# Patient Record
Sex: MALE | NOT HISPANIC OR LATINO | URBAN - METROPOLITAN AREA
[De-identification: names, ages, dates, MRNs, and addresses within clinical notes are randomized per-mention and may not be internally consistent; named-entity substitution may affect disease eponyms.]

---

## 2023-05-26 ENCOUNTER — OFFICE VISIT (OUTPATIENT)
Dept: URBAN - METROPOLITAN AREA CLINIC 63 | Facility: CLINIC | Age: 48
End: 2023-05-26

## 2023-06-01 ENCOUNTER — WEB ENCOUNTER (OUTPATIENT)
Dept: URBAN - METROPOLITAN AREA CLINIC 63 | Facility: CLINIC | Age: 48
End: 2023-06-01

## 2023-06-01 ENCOUNTER — LAB OUTSIDE AN ENCOUNTER (OUTPATIENT)
Dept: URBAN - METROPOLITAN AREA CLINIC 63 | Facility: CLINIC | Age: 48
End: 2023-06-01

## 2023-06-01 ENCOUNTER — OFFICE VISIT (OUTPATIENT)
Dept: URBAN - METROPOLITAN AREA CLINIC 63 | Facility: CLINIC | Age: 48
End: 2023-06-01
Payer: COMMERCIAL

## 2023-06-01 VITALS
SYSTOLIC BLOOD PRESSURE: 130 MMHG | OXYGEN SATURATION: 98 % | HEIGHT: 67 IN | BODY MASS INDEX: 31.36 KG/M2 | HEART RATE: 83 BPM | WEIGHT: 199.8 LBS | DIASTOLIC BLOOD PRESSURE: 86 MMHG | TEMPERATURE: 97.5 F

## 2023-06-01 DIAGNOSIS — R79.89 ABNORMAL LFTS: ICD-10-CM

## 2023-06-01 DIAGNOSIS — R19.8 RECTAL DISCHARGE: ICD-10-CM

## 2023-06-01 DIAGNOSIS — Z12.11 COLON CANCER SCREENING: ICD-10-CM

## 2023-06-01 DIAGNOSIS — K62.5 RECTAL BLEEDING: ICD-10-CM

## 2023-06-01 PROCEDURE — 99204 OFFICE O/P NEW MOD 45 MIN: CPT | Performed by: PHYSICIAN ASSISTANT

## 2023-06-01 RX ORDER — ONDANSETRON HYDROCHLORIDE 4 MG/1
1 TABLET TABLET, FILM COATED ORAL
Qty: 2 | Refills: 0 | OUTPATIENT
Start: 2023-06-01

## 2023-06-01 RX ORDER — POLYETHYLENE GLYCOL 3350, SODIUM CHLORIDE, SODIUM BICARBONATE, POTASSIUM CHLORIDE 420; 11.2; 5.72; 1.48 G/4L; G/4L; G/4L; G/4L
AS DIRECTED POWDER, FOR SOLUTION ORAL ONCE
Qty: 4000 | Refills: 0 | OUTPATIENT
Start: 2023-06-01 | End: 2023-06-02

## 2023-06-01 NOTE — HPI-TODAY'S VISIT:
Alton is a pleasant 47-year-old male who presents today for evaluation of a screening colonoscopy, rectal bleeding and rectal discharge  Labs dated 5/25/2023 showed a normal renal function.  AST 70, ALT 81 but otherwise normal LFTs.  Normal TSH.  Normal hemoglobin.  Normal platelets.  Ever since he had COVID about a year ago, he has had persistent rectal discharge. Occasional BRBPR when wiping. Patient is colonoscopy naive. Notes 1-2 daily bowel movements without constipation or diarrhea. Denies nausea, vomiting, heartburn, reflux, dysphagia, odynophagia, hematemesis, coffee ground emesis, abdominal pain, change in bowel habits, abnormal weight loss or melena. Denies family history of colon cancer or colon polyps. No other GI complaints at this time

## 2023-06-08 ENCOUNTER — LAB OUTSIDE AN ENCOUNTER (OUTPATIENT)
Dept: URBAN - METROPOLITAN AREA CLINIC 63 | Facility: CLINIC | Age: 48
End: 2023-06-08

## 2023-06-19 ENCOUNTER — OFFICE VISIT (OUTPATIENT)
Dept: URBAN - METROPOLITAN AREA SURGERY CENTER 4 | Facility: SURGERY CENTER | Age: 48
End: 2023-06-19
Payer: COMMERCIAL

## 2023-06-19 ENCOUNTER — TELEPHONE ENCOUNTER (OUTPATIENT)
Dept: URBAN - METROPOLITAN AREA CLINIC 63 | Facility: CLINIC | Age: 48
End: 2023-06-19

## 2023-06-19 ENCOUNTER — OUT OF OFFICE VISIT (OUTPATIENT)
Dept: URBAN - METROPOLITAN AREA SURGERY CENTER 4 | Facility: SURGERY CENTER | Age: 48
End: 2023-06-19

## 2023-06-19 DIAGNOSIS — Z12.11 COLON CANCER SCREENING: ICD-10-CM

## 2023-06-19 DIAGNOSIS — K62.1 RECTAL POLYP: ICD-10-CM

## 2023-06-19 DIAGNOSIS — D12.8 BENIGN RECTAL TUMOR: ICD-10-CM

## 2023-06-19 DIAGNOSIS — D12.2 ADENOMATOUS POLYP OF ASCENDING COLON: ICD-10-CM

## 2023-06-19 DIAGNOSIS — D12.3 BENIGN NEOPLASM OF TRANSVERSE COLON: ICD-10-CM

## 2023-06-19 DIAGNOSIS — D12.2 BENIGN NEOPLASM OF ASCENDING COLON: ICD-10-CM

## 2023-06-19 PROCEDURE — 45380 COLONOSCOPY AND BIOPSY: CPT | Performed by: INTERNAL MEDICINE

## 2023-06-19 PROCEDURE — 45385 COLONOSCOPY W/LESION REMOVAL: CPT | Performed by: INTERNAL MEDICINE

## 2023-06-19 PROCEDURE — 00811 ANES LWR INTST NDSC NOS: CPT | Performed by: NURSE ANESTHETIST, CERTIFIED REGISTERED

## 2023-06-19 RX ORDER — ONDANSETRON HYDROCHLORIDE 4 MG/1
1 TABLET TABLET, FILM COATED ORAL
Qty: 2 | Refills: 0 | Status: ACTIVE | COMMUNITY
Start: 2023-06-01

## 2023-07-11 ENCOUNTER — OFFICE VISIT (OUTPATIENT)
Dept: URBAN - METROPOLITAN AREA CLINIC 63 | Facility: CLINIC | Age: 48
End: 2023-07-11
Payer: COMMERCIAL

## 2023-07-11 VITALS
BODY MASS INDEX: 31.08 KG/M2 | OXYGEN SATURATION: 97 % | SYSTOLIC BLOOD PRESSURE: 110 MMHG | DIASTOLIC BLOOD PRESSURE: 60 MMHG | HEART RATE: 60 BPM | WEIGHT: 198 LBS | TEMPERATURE: 98.7 F | HEIGHT: 67 IN

## 2023-07-11 DIAGNOSIS — L98.8 FISTULA: ICD-10-CM

## 2023-07-11 DIAGNOSIS — R79.89 ABNORMAL LFTS: ICD-10-CM

## 2023-07-11 DIAGNOSIS — Z12.11 COLON CANCER SCREENING: ICD-10-CM

## 2023-07-11 DIAGNOSIS — K62.5 RECTAL BLEEDING: ICD-10-CM

## 2023-07-11 DIAGNOSIS — R19.8 RECTAL DISCHARGE: ICD-10-CM

## 2023-07-11 DIAGNOSIS — Z86.010 PERSONAL HISTORY OF COLONIC POLYPS: ICD-10-CM

## 2023-07-11 DIAGNOSIS — D36.9 TUBULOVILLOUS ADENOMA: ICD-10-CM

## 2023-07-11 PROBLEM — 428283002: Status: ACTIVE | Noted: 2023-07-11

## 2023-07-11 PROCEDURE — 99213 OFFICE O/P EST LOW 20 MIN: CPT | Performed by: PHYSICIAN ASSISTANT

## 2023-07-11 NOTE — HPI-TODAY'S VISIT:
Alton is a pleasant 47-year-old male who presents today for a procedure follow up. Seen last visit in evaluation of rectal bleeding and rectal discharge  Colonoscopy dated 6/19/2023 showed an 8 mm tubulovillous adenoma removed from the mid ascending colon.  Incomplete resection.  5 mm tubular adenoma removed from the proximal ascending colon. Diminutive degenerative type polyp removed from the mid transverse colon.  1 regenerative type polyp with focus of acute and chronic inflammation removed from the rectum.  Repeat colonoscopy in 6 months due to piecemeal polypectomy.  Consider colorectal surgery evaluation.  Questionable fistula in the rectum.  Labs dated 5/25/2023 showed a normal renal function.  AST 70, ALT 81 but otherwise normal LFTs.  Normal TSH.  Normal hemoglobin.  Normal platelets.  No issues after procedure. Labs ordered last visit not completed. US and FibroScan ordered last visit not completed. Last visit noted chronic history of rectal discharge. Occasional BRBPR when wiping. Notes 1-2 daily bowel movements without constipation or diarrhea. Denies nausea, vomiting, heartburn, reflux, dysphagia, odynophagia, hematemesis, coffee ground emesis, abdominal pain, change in bowel habits, abnormal weight loss or melena. Denies family history of colon cancer or colon polyps. No other GI complaints at this time

## 2023-09-13 ENCOUNTER — OFFICE VISIT (OUTPATIENT)
Dept: URBAN - METROPOLITAN AREA CLINIC 63 | Facility: CLINIC | Age: 48
End: 2023-09-13
Payer: COMMERCIAL

## 2023-09-13 VITALS
HEIGHT: 67 IN | WEIGHT: 188 LBS | HEART RATE: 55 BPM | SYSTOLIC BLOOD PRESSURE: 114 MMHG | DIASTOLIC BLOOD PRESSURE: 80 MMHG | TEMPERATURE: 97.3 F | OXYGEN SATURATION: 97 % | BODY MASS INDEX: 29.51 KG/M2

## 2023-09-13 DIAGNOSIS — D36.9 TUBULOVILLOUS ADENOMA: ICD-10-CM

## 2023-09-13 DIAGNOSIS — R79.89 ABNORMAL LFTS: ICD-10-CM

## 2023-09-13 DIAGNOSIS — Z86.010 PERSONAL HISTORY OF COLONIC POLYPS: ICD-10-CM

## 2023-09-13 PROCEDURE — 99213 OFFICE O/P EST LOW 20 MIN: CPT | Performed by: PHYSICIAN ASSISTANT

## 2023-09-13 RX ORDER — ONDANSETRON HYDROCHLORIDE 4 MG/1
1 TABLET TABLET, FILM COATED ORAL
Qty: 2 | Refills: 0 | OUTPATIENT
Start: 2023-09-13

## 2023-09-13 RX ORDER — EZETIMIBE 10 MG/1
TABLET ORAL
Qty: 90 TABLET | Refills: 0 | Status: ACTIVE | COMMUNITY

## 2023-09-13 RX ORDER — POLYETHYLENE GLYCOL 3350, SODIUM CHLORIDE, SODIUM BICARBONATE, POTASSIUM CHLORIDE 420; 11.2; 5.72; 1.48 G/4L; G/4L; G/4L; G/4L
AS DIRECTED POWDER, FOR SOLUTION ORAL ONCE
Qty: 4000 | Refills: 0 | OUTPATIENT
Start: 2023-09-13 | End: 2023-09-14

## 2023-09-13 NOTE — HPI-TODAY'S VISIT:
Alton is a pleasant 47-year-old male who presents today for a follow up.  Ultrasound dated 8/2/2023 showed no evidence of cholelithiasis.  Limited visualization of the pancreas secondary to overlying bowel gas.  Increased echogenicity of the liver.  Findings are consistent with fatty infiltration with areas of focal fatty sparing  FibroScan dated 8/2/2023 showed S0 and F0  Underwent EUA/fissurectomy with Dr. Yip on 8/18/2023.  Oval ulcer in the posterior midline in the anal canal extending to the anal verge, 1 cm by half centimeter.  Colonoscopy dated 6/19/2023 showed an 8 mm tubulovillous adenoma removed from the mid ascending colon.  Incomplete resection.  5 mm tubular adenoma removed from the proximal ascending colon. Diminutive degenerative type polyp removed from the mid transverse colon.  1 regenerative type polyp with focus of acute and chronic inflammation removed from the rectum.  Repeat colonoscopy in 6 months due to piecemeal polypectomy.  Consider colorectal surgery evaluation.  Questionable fistula in the rectum.  Labs dated 5/25/2023 showed a normal renal function.  AST 70, ALT 81 but otherwise normal LFTs.  Normal TSH.  Normal hemoglobin.  Normal platelets.  Labs ordered not completed. Chronic history of rectal discharge and BRBPR when wiping subsided after recent procedure with Dr. Yip. Notes 1-2 daily bowel movements without constipation or diarrhea. Denies nausea, vomiting, heartburn, reflux, dysphagia, odynophagia, hematemesis, coffee ground emesis, abdominal pain, change in bowel habits, abnormal weight loss, BRBPR or melena. Denies family history of colon cancer or colon polyps. No other GI complaints at this time

## 2023-09-20 ENCOUNTER — LAB OUTSIDE AN ENCOUNTER (OUTPATIENT)
Dept: URBAN - METROPOLITAN AREA CLINIC 63 | Facility: CLINIC | Age: 48
End: 2023-09-20

## 2023-10-15 LAB
A/G RATIO: 1.5
ACTIN (SMOOTH MUSCLE) ANTIBODY (IGG): <20
ALBUMIN: 4.4
ALKALINE PHOSPHATASE: 92
ALT (SGPT): 22
ANA SCREEN, IFA: NEGATIVE
AST (SGOT): 18
BILIRUBIN, TOTAL: 1.4
BUN/CREATININE RATIO: (no result)
BUN: 14
CALCIUM: 9.3
CARBON DIOXIDE, TOTAL: 28
CERULOPLASMIN: 29
CHLORIDE: 107
CREATININE: 0.84
EGFR: 107
FERRITIN, SERUM: 99
GLOBULIN, TOTAL: 3
GLUCOSE: 135
HEMATOCRIT: 48.9
HEMOGLOBIN: 16.4
HEP A AB, IGM: (no result)
HEP B CORE AB, IGM: (no result)
HEPATITIS A AB, TOTAL: REACTIVE
HEPATITIS B CORE AB TOTAL: (no result)
HEPATITIS B SURFACE AB IMMUNITY, QN: <5
HEPATITIS B SURFACE ANTIGEN: (no result)
HEPATITIS C ANTIBODY: (no result)
IMMUNOGLOBULIN A: 358
IMMUNOGLOBULIN G: 1561
IMMUNOGLOBULIN M: 90
INR: 1
IRON BIND.CAP.(TIBC): 366
IRON SATURATION: 37
IRON: 137
LKM-1 ANTIBODY (IGG): <=20
MCH: 30.9
MCHC: 33.5
MCV: 92.1
MITOCHONDRIAL (M2) ANTIBODY: <=20
MPV: 11.5
PLATELET COUNT: 236
POTASSIUM: 4
PROTEIN, TOTAL: 7.4
PT: 10.9
RDW: 12
RED BLOOD CELL COUNT: 5.31
RHEUMATOID FACTOR: <14
SJOGREN'S ANTIBODY (SS-A): (no result)
SJOGREN'S ANTIBODY (SS-B): (no result)
SODIUM: 142
WHITE BLOOD CELL COUNT: 9.5

## 2024-01-15 ENCOUNTER — TELEPHONE ENCOUNTER (OUTPATIENT)
Dept: URBAN - METROPOLITAN AREA CLINIC 63 | Facility: CLINIC | Age: 49
End: 2024-01-15

## 2024-01-15 RX ORDER — ONDANSETRON HYDROCHLORIDE 4 MG/1
1 TABLET TABLET, FILM COATED ORAL
Qty: 2 | Refills: 0
Start: 2023-09-13

## 2024-01-15 RX ORDER — POLYETHYLENE GLYCOL 3350, SODIUM CHLORIDE, SODIUM BICARBONATE, POTASSIUM CHLORIDE 420; 11.2; 5.72; 1.48 G/4L; G/4L; G/4L; G/4L
AS DIRECTED POWDER, FOR SOLUTION ORAL ONCE
Qty: 4000 | Refills: 0
Start: 2023-09-13 | End: 2024-01-16

## 2024-01-16 ENCOUNTER — OFFICE VISIT (OUTPATIENT)
Dept: URBAN - METROPOLITAN AREA SURGERY CENTER 4 | Facility: SURGERY CENTER | Age: 49
End: 2024-01-16

## 2024-01-16 RX ORDER — POLYETHYLENE GLYCOL 3350, SODIUM CHLORIDE, SODIUM BICARBONATE, POTASSIUM CHLORIDE 420; 11.2; 5.72; 1.48 G/4L; G/4L; G/4L; G/4L
AS DIRECTED POWDER, FOR SOLUTION ORAL ONCE
Qty: 4000 | Refills: 0 | Status: ACTIVE | COMMUNITY
Start: 2023-09-13 | End: 2024-01-16

## 2024-01-16 RX ORDER — ONDANSETRON HYDROCHLORIDE 4 MG/1
1 TABLET TABLET, FILM COATED ORAL
Qty: 2 | Refills: 0 | Status: ACTIVE | COMMUNITY
Start: 2023-09-13

## 2024-01-16 RX ORDER — EZETIMIBE 10 MG/1
TABLET ORAL
Qty: 90 TABLET | Refills: 0 | Status: ACTIVE | COMMUNITY

## 2024-02-01 ENCOUNTER — OV EP (OUTPATIENT)
Dept: URBAN - METROPOLITAN AREA CLINIC 63 | Facility: CLINIC | Age: 49
End: 2024-02-01

## 2024-02-16 ENCOUNTER — COLON (OUTPATIENT)
Dept: URBAN - METROPOLITAN AREA SURGERY CENTER 4 | Facility: SURGERY CENTER | Age: 49
End: 2024-02-16
Payer: COMMERCIAL

## 2024-02-16 DIAGNOSIS — K64.8 OTHER HEMORRHOIDS: ICD-10-CM

## 2024-02-16 DIAGNOSIS — K63.5 POLYP OF ASCENDING COLON, UNSPECIFIED TYPE: ICD-10-CM

## 2024-02-16 DIAGNOSIS — Z86.010 PERSONAL HISTORY OF COLONIC POLYPS: ICD-10-CM

## 2024-02-16 PROCEDURE — 45385 COLONOSCOPY W/LESION REMOVAL: CPT | Performed by: INTERNAL MEDICINE

## 2024-02-16 PROCEDURE — 45380 COLONOSCOPY AND BIOPSY: CPT | Performed by: INTERNAL MEDICINE

## 2024-02-16 RX ORDER — EZETIMIBE 10 MG/1
TABLET ORAL
Qty: 90 TABLET | Refills: 0 | Status: ACTIVE | COMMUNITY

## 2024-02-16 RX ORDER — ONDANSETRON HYDROCHLORIDE 4 MG/1
1 TABLET TABLET, FILM COATED ORAL
Qty: 2 | Refills: 0 | Status: ACTIVE | COMMUNITY
Start: 2023-09-13

## 2024-03-01 ENCOUNTER — OV EP (OUTPATIENT)
Dept: URBAN - METROPOLITAN AREA CLINIC 63 | Facility: CLINIC | Age: 49
End: 2024-03-01

## 2024-03-01 RX ORDER — EZETIMIBE 10 MG/1
TABLET ORAL
Qty: 90 TABLET | Refills: 0 | COMMUNITY

## 2024-03-01 RX ORDER — ONDANSETRON HYDROCHLORIDE 4 MG/1
1 TABLET TABLET, FILM COATED ORAL
Qty: 2 | Refills: 0 | OUTPATIENT